# Patient Record
Sex: MALE | Race: BLACK OR AFRICAN AMERICAN | NOT HISPANIC OR LATINO | Employment: FULL TIME | URBAN - METROPOLITAN AREA
[De-identification: names, ages, dates, MRNs, and addresses within clinical notes are randomized per-mention and may not be internally consistent; named-entity substitution may affect disease eponyms.]

---

## 2019-09-20 ENCOUNTER — APPOINTMENT (OUTPATIENT)
Dept: GENERAL RADIOLOGY | Facility: HOSPITAL | Age: 39
End: 2019-09-20

## 2019-09-20 ENCOUNTER — HOSPITAL ENCOUNTER (EMERGENCY)
Facility: HOSPITAL | Age: 39
Discharge: HOME | End: 2019-09-20
Attending: EMERGENCY MEDICINE | Admitting: EMERGENCY MEDICINE

## 2019-09-20 VITALS
HEIGHT: 68 IN | HEART RATE: 64 BPM | SYSTOLIC BLOOD PRESSURE: 149 MMHG | BODY MASS INDEX: 26.52 KG/M2 | RESPIRATION RATE: 16 BRPM | DIASTOLIC BLOOD PRESSURE: 100 MMHG | WEIGHT: 175 LBS | TEMPERATURE: 97.6 F | OXYGEN SATURATION: 99 %

## 2019-09-20 DIAGNOSIS — S62.339A CLOSED BOXER'S FRACTURE, INITIAL ENCOUNTER: Primary | ICD-10-CM

## 2019-09-20 PROCEDURE — 99283 EMERGENCY DEPT VISIT LOW MDM: CPT

## 2019-09-20 PROCEDURE — 73130 X-RAY EXAM OF HAND: CPT

## 2019-09-20 RX ORDER — IBUPROFEN 800 MG/1
800 TABLET ORAL ONCE
Status: COMPLETED | OUTPATIENT
Start: 2019-09-20 | End: 2019-09-20

## 2019-09-20 RX ADMIN — IBUPROFEN 800 MG: 800 TABLET, FILM COATED ORAL at 13:25

## 2019-09-20 NOTE — ED PROVIDER NOTES
EMERGENCY DEPARTMENT ENCOUNTER    Room Number:  15/15  Date seen:  9/20/2019  Time seen: 1:11 PM  PCP: Provider, No Known  Historian: Pt      HPI:  Chief Complaint: Wrist injury  A complete HPI/ROS/PMH/PSH/SH/FH are unobtainable due to: None  Context: Ahmet Constantino is a 38 y.o. male who presents to the ED c/o a wrist injury that occurred yesterday s/p mechanical  fell at his home and caught his fall with his hands. He reports his wrist is swollen and has difficulty moveing his pinky. He denies any numbness or tingling. Pt has no PMHx.     PAST MEDICAL HISTORY  Active Ambulatory Problems     Diagnosis Date Noted   • No Active Ambulatory Problems     Resolved Ambulatory Problems     Diagnosis Date Noted   • No Resolved Ambulatory Problems     No Additional Past Medical History         PAST SURGICAL HISTORY  History reviewed. No pertinent surgical history.      FAMILY HISTORY  History reviewed. No pertinent family history.      SOCIAL HISTORY  Social History     Socioeconomic History   • Marital status:      Spouse name: Not on file   • Number of children: Not on file   • Years of education: Not on file   • Highest education level: Not on file   Tobacco Use   • Smoking status: Never Smoker   Substance and Sexual Activity   • Alcohol use: No     Frequency: Never         ALLERGIES  Patient has no known allergies.        REVIEW OF SYSTEMS  Review of Systems   Musculoskeletal: Positive for arthralgias (R hand pain ).   All other systems reviewed and are negative.           PHYSICAL EXAM  ED Triage Vitals [09/20/19 1256]   Temp Heart Rate Resp BP SpO2   97.6 °F (36.4 °C) 73 17 -- 100 %      Temp src Heart Rate Source Patient Position BP Location FiO2 (%)   Tympanic -- -- -- --         GENERAL: no acute distress  HENT: nares patent  EYES: no scleral icterus  RESPIRATORY: normal effort  MUSCULOSKELETAL: no deformity, R hand: point tenderness over 4th/5th metacarpals with mild soft tissue swelling, brisk cap refill in  all digits, 2+ R radial pulse, nml sensation throughout R hand, some limitation of flexion of R 5th digit.  NEURO: alert, moves all extremities, follows commands  SKIN: warm, dry    Vital signs and nursing notes reviewed.            RADIOLOGY  Xr Hand 3+ View Right    Result Date: 9/20/2019  RIGHT HAND X-RAY  HISTORY: Fall today. Pain and swelling of the fourth and fifth metacarpals.  Three x-rays of the right hand are provided.  FINDINGS: There is soft tissue swelling dorsally. There is a fracture at the neck of the fifth metacarpal with very slight volar displacement of the metacarpal head. The other metacarpals appear intact. The other bones of the hand are intact.      Minimally angulated fracture of the distal right fifth metacarpal.  This report was finalized on 9/20/2019 2:03 PM by Dr. Jeremy Mary M.D.        Ordered the above noted radiological studies. Reviewed by me in PACS.           PROCEDURES  Splint - Cast - Strapping  Date/Time: 9/20/2019 3:02 PM  Performed by: Abiel Roblero MD  Authorized by: Abiel Roblero MD     Consent:     Consent obtained:  Verbal    Consent given by:  Patient  Procedure details:     Laterality:  Right    Location:  Hand    Hand:  R hand    Splint type:  Ulnar gutter    Supplies:  Ortho-Glass  Post-procedure details:     Pain:  Unchanged    Sensation:  Normal    Patient tolerance of procedure:  Tolerated well, no immediate complications              MEDICATIONS GIVEN IN ER  Medications   ibuprofen (ADVIL,MOTRIN) tablet 800 mg (800 mg Oral Given 9/20/19 1325)                   PROGRESS AND CONSULTS           MEDICAL DECISION MAKING    Patient advised to follow-up with orthopedic hand surgery for further treatment.  NSAIDs PRN for pain.    MDM           DIAGNOSIS  Final diagnoses:   Closed boxer's fracture, initial encounter         DISPOSITION  DISCHARGE    Patient discharged in stable condition.    Reviewed implications of results, diagnosis, meds,  responsibility to follow up, warning signs and symptoms of possible worsening, potential complications and reasons to return to ER.    Patient/Family voiced understanding of above instructions.    Discussed plan for discharge, as there is no emergent indication for admission. Patient referred to primary care provider for BP management due to today's BP. Pt/family is agreeable and understands need for follow up and repeat testing.  Pt is aware that discharge does not mean that nothing is wrong but it indicates no emergency is present that requires admission and they must continue care with follow-up as given below or physician of their choice.     FOLLOW-UP  Cory Bonilla MD  [address], [place] [id number]  [phone]    Schedule an appointment as soon as possible for a visit            Medication List      No changes were made to your prescriptions during this visit.                   Latest Documented Vital Signs:  As of 3:02 PM  BP- 149/100 HR- 64 Temp- 97.6 °F (36.4 °C) (Tympanic) O2 sat- 99%        --  Documentation assistance provided by jose alejandro Roblero MD for Dr. HNOORIO Roblero MD.  Information recorded by the scribe was done at my direction and has been verified and validated by me.      Please note that portions of this were completed with a voice recognition program.          Bernie Mallory  09/20/19 [id number]       Abiel Roblero MD  09/20/19 [id number]